# Patient Record
Sex: MALE | Race: BLACK OR AFRICAN AMERICAN | NOT HISPANIC OR LATINO | Employment: UNEMPLOYED | URBAN - METROPOLITAN AREA
[De-identification: names, ages, dates, MRNs, and addresses within clinical notes are randomized per-mention and may not be internally consistent; named-entity substitution may affect disease eponyms.]

---

## 2023-01-31 ENCOUNTER — HOSPITAL ENCOUNTER (EMERGENCY)
Dept: DATA CONVERSION | Facility: HOSPITAL | Age: 53
Discharge: HOME | End: 2023-02-01
Attending: EMERGENCY MEDICINE

## 2023-01-31 DIAGNOSIS — S60.413A ABRASION OF LEFT MIDDLE FINGER, INITIAL ENCOUNTER: ICD-10-CM
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
## 2023-01-31 DIAGNOSIS — R55 SYNCOPE AND COLLAPSE: ICD-10-CM

## 2023-01-31 DIAGNOSIS — Z20.822 CONTACT WITH AND (SUSPECTED) EXPOSURE TO COVID-19: ICD-10-CM

## 2023-01-31 DIAGNOSIS — F41.9 ANXIETY DISORDER, UNSPECIFIED: ICD-10-CM

## 2023-01-31 DIAGNOSIS — T07.XXXA UNSPECIFIED MULTIPLE INJURIES, INITIAL ENCOUNTER: ICD-10-CM

## 2023-01-31 DIAGNOSIS — G89.11 ACUTE PAIN DUE TO TRAUMA: ICD-10-CM

## 2023-01-31 DIAGNOSIS — Z23 ENCOUNTER FOR IMMUNIZATION: ICD-10-CM

## 2023-01-31 DIAGNOSIS — R09.89 OTHER SPECIFIED SYMPTOMS AND SIGNS INVOLVING THE CIRCULATORY AND RESPIRATORY SYSTEMS: ICD-10-CM

## 2023-01-31 DIAGNOSIS — R74.02 ELEVATION OF LEVELS OF LACTIC ACID DEHYDROGENASE (LDH): ICD-10-CM

## 2023-01-31 DIAGNOSIS — R45.1 RESTLESSNESS AND AGITATION: ICD-10-CM

## 2023-01-31 DIAGNOSIS — R04.0 EPISTAXIS: ICD-10-CM

## 2023-01-31 DIAGNOSIS — S02.2XXA FRACTURE OF NASAL BONES, INITIAL ENCOUNTER FOR CLOSED FRACTURE: ICD-10-CM

## 2023-01-31 DIAGNOSIS — R00.0 TACHYCARDIA, UNSPECIFIED: ICD-10-CM

## 2023-01-31 DIAGNOSIS — M17.0 BILATERAL PRIMARY OSTEOARTHRITIS OF KNEE: ICD-10-CM

## 2023-01-31 DIAGNOSIS — R51.9 HEADACHE, UNSPECIFIED: ICD-10-CM
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
## 2023-01-31 DIAGNOSIS — S01.21XA LACERATION WITHOUT FOREIGN BODY OF NOSE, INITIAL ENCOUNTER: ICD-10-CM

## 2023-01-31 DIAGNOSIS — R41.0 DISORIENTATION, UNSPECIFIED: ICD-10-CM

## 2023-01-31 DIAGNOSIS — S00.31XA ABRASION OF NOSE, INITIAL ENCOUNTER: ICD-10-CM

## 2023-01-31 DIAGNOSIS — S01.01XA LACERATION WITHOUT FOREIGN BODY OF SCALP, INITIAL ENCOUNTER: ICD-10-CM

## 2023-01-31 DIAGNOSIS — Z72.0 TOBACCO USE: ICD-10-CM

## 2023-01-31 DIAGNOSIS — F91.8 OTHER CONDUCT DISORDERS: ICD-10-CM

## 2023-01-31 LAB
ABO GROUP (TYPE) IN BLOOD: NORMAL
ABO GROUP (TYPE) IN BLOOD: NORMAL
ANION GAP IN SER/PLAS: 18 MMOL/L (ref 10–20)
ANTIBODY SCREEN: NORMAL
ANTIBODY SCREEN: NORMAL
CALCIUM (MG/DL) IN SER/PLAS: 9.8 MG/DL (ref 8.6–10.6)
CARBON DIOXIDE, TOTAL (MMOL/L) IN SER/PLAS: 19 MMOL/L (ref 21–32)
CHLORIDE (MMOL/L) IN SER/PLAS: 107 MMOL/L (ref 98–107)
CREATININE (MG/DL) IN SER/PLAS: 0.91 MG/DL (ref 0.5–1.3)
ERYTHROCYTE DISTRIBUTION WIDTH (RATIO) BY AUTOMATED COUNT: 12.5 % (ref 11.5–14.5)
ERYTHROCYTE MEAN CORPUSCULAR HEMOGLOBIN CONCENTRATION (G/DL) BY AUTOMATED: 35.3 G/DL (ref 32–36)
ERYTHROCYTE MEAN CORPUSCULAR VOLUME (FL) BY AUTOMATED COUNT: 98 FL (ref 80–100)
ERYTHROCYTES (10*6/UL) IN BLOOD BY AUTOMATED COUNT: 4.57 X10E12/L (ref 4.5–5.9)
ETHANOL (MG/DL) IN SER/PLAS: 312 MG/DL
FIBRINOGEN (MG/DL) IN PPP BY COAGULATION ASSAY: 183 MG/DL (ref 200–400)
GFR MALE: >90 ML/MIN/1.73M2
GLUCOSE (MG/DL) IN SER/PLAS: 150 MG/DL (ref 74–99)
HEMATOCRIT (%) IN BLOOD BY AUTOMATED COUNT: 45 % (ref 41–52)
HEMOGLOBIN (G/DL) IN BLOOD: 15.9 G/DL (ref 13.5–17.5)
INR IN PPP BY COAGULATION ASSAY: 0.9 (ref 0.9–1.1)
LEUKOCYTES (10*3/UL) IN BLOOD BY AUTOMATED COUNT: 2.7 X10E9/L (ref 4.4–11.3)
MAGNESIUM (MG/DL) IN SER/PLAS: 2.08 MG/DL (ref 1.6–2.4)
NRBC (PER 100 WBCS) BY AUTOMATED COUNT: 0 /100 WBC (ref 0–0)
PATIENT TEMPERATURE: 37 DEGREES C
PATIENT TEMPERATURE: 37 DEGREES C
PHOSPHATE (MG/DL) IN SER/PLAS: 2.6 MG/DL (ref 2.5–4.9)
PLATELETS (10*3/UL) IN BLOOD AUTOMATED COUNT: 217 X10E9/L (ref 150–450)
POCT ANION GAP, VENOUS: 14 MMOL/L (ref 10–25)
POCT ANION GAP, VENOUS: 16 MMOL/L (ref 10–25)
POCT BASE EXCESS, VENOUS: -3 MMOL/L (ref -2–3)
POCT BASE EXCESS, VENOUS: -3.8 MMOL/L (ref -2–3)
POCT CHLORIDE, VENOUS: 108 MMOL/L (ref 98–107)
POCT CHLORIDE, VENOUS: 108 MMOL/L (ref 98–107)
POCT GLUCOSE, VENOUS: 151 MG/DL (ref 74–99)
POCT GLUCOSE, VENOUS: 158 MG/DL (ref 74–99)
POCT HCO3, VENOUS: 19.6 MMOL/L (ref 22–26)
POCT HCO3, VENOUS: 21.2 MMOL/L (ref 22–26)
POCT HEMATOCRIT, VENOUS: 47 % (ref 41–52)
POCT HEMATOCRIT, VENOUS: 48 % (ref 41–52)
POCT HEMOGLOBIN, VENOUS: 15.7 G/DL (ref 13.5–17.5)
POCT HEMOGLOBIN, VENOUS: 16.1 G/DL (ref 13.5–17.5)
POCT IONIZED CALCIUM, VENOUS: 1.18 MMOL/L (ref 1.1–1.33)
POCT IONIZED CALCIUM, VENOUS: 1.19 MMOL/L (ref 1.1–1.33)
POCT LACTATE, VENOUS: 4.1 MMOL/L (ref 0.4–2)
POCT LACTATE, VENOUS: 4.7 MMOL/L (ref 0.4–2)
POCT OXY HEMOGLOBIN, VENOUS: 83.1 % (ref 45–75)
POCT OXY HEMOGLOBIN, VENOUS: 89.2 % (ref 45–75)
POCT PCO2, VENOUS: 31 MMHG (ref 41–51)
POCT PCO2, VENOUS: 35 MMHG (ref 41–51)
POCT PH, VENOUS: 7.39 (ref 7.33–7.43)
POCT PH, VENOUS: 7.41 (ref 7.33–7.43)
POCT PO2, VENOUS: 60 MMHG (ref 35–45)
POCT PO2, VENOUS: 67 MMHG (ref 35–45)
POCT POTASSIUM, VENOUS: 3.9 MMOL/L (ref 3.5–5.3)
POCT POTASSIUM, VENOUS: 4.5 MMOL/L (ref 3.5–5.3)
POCT SO2, VENOUS: 89 % (ref 45–75)
POCT SO2, VENOUS: 94 % (ref 45–75)
POCT SODIUM, VENOUS: 139 MMOL/L (ref 136–145)
POCT SODIUM, VENOUS: 140 MMOL/L (ref 136–145)
POTASSIUM (MMOL/L) IN SER/PLAS: 4.2 MMOL/L (ref 3.5–5.3)
PROTHROMBIN TIME (PT) IN PPP BY COAGULATION ASSAY: 10.3 SEC (ref 9.8–13.4)
RH FACTOR: NORMAL
RH FACTOR: NORMAL
SARS-COV-2 RESULT: NOT DETECTED
SODIUM (MMOL/L) IN SER/PLAS: 140 MMOL/L (ref 136–145)
UREA NITROGEN (MG/DL) IN SER/PLAS: 14 MG/DL (ref 6–23)

## 2023-02-01 LAB
PATIENT TEMPERATURE: 37 DEGREES C
PATIENT TEMPERATURE: 37 DEGREES C
POCT ANION GAP, VENOUS: 13 MMOL/L (ref 10–25)
POCT ANION GAP, VENOUS: 16 MMOL/L (ref 10–25)
POCT BASE EXCESS, VENOUS: -3.7 MMOL/L (ref -2–3)
POCT BASE EXCESS, VENOUS: -4.2 MMOL/L (ref -2–3)
POCT CHLORIDE, VENOUS: 107 MMOL/L (ref 98–107)
POCT CHLORIDE, VENOUS: 108 MMOL/L (ref 98–107)
POCT GLUCOSE, VENOUS: 143 MG/DL (ref 74–99)
POCT GLUCOSE, VENOUS: 200 MG/DL (ref 74–99)
POCT HCO3, VENOUS: 20.1 MMOL/L (ref 22–26)
POCT HCO3, VENOUS: 20.7 MMOL/L (ref 22–26)
POCT HEMATOCRIT, VENOUS: 43 % (ref 41–52)
POCT HEMATOCRIT, VENOUS: 45 % (ref 41–52)
POCT HEMOGLOBIN, VENOUS: 14.3 G/DL (ref 13.5–17.5)
POCT HEMOGLOBIN, VENOUS: 14.9 G/DL (ref 13.5–17.5)
POCT IONIZED CALCIUM, VENOUS: 1.22 MMOL/L (ref 1.1–1.33)
POCT IONIZED CALCIUM, VENOUS: 1.23 MMOL/L (ref 1.1–1.33)
POCT LACTATE, VENOUS: 3.6 MMOL/L (ref 0.4–2)
POCT LACTATE, VENOUS: 4.3 MMOL/L (ref 0.4–2)
POCT OXY HEMOGLOBIN, VENOUS: 83.4 % (ref 45–75)
POCT OXY HEMOGLOBIN, VENOUS: 83.6 % (ref 45–75)
POCT PCO2, VENOUS: 34 MMHG (ref 41–51)
POCT PCO2, VENOUS: 35 MMHG (ref 41–51)
POCT PH, VENOUS: 7.38 (ref 7.33–7.43)
POCT PH, VENOUS: 7.38 (ref 7.33–7.43)
POCT PO2, VENOUS: 56 MMHG (ref 35–45)
POCT PO2, VENOUS: 56 MMHG (ref 35–45)
POCT POTASSIUM, VENOUS: 3.8 MMOL/L (ref 3.5–5.3)
POCT POTASSIUM, VENOUS: 4.1 MMOL/L (ref 3.5–5.3)
POCT SO2, VENOUS: 87 % (ref 45–75)
POCT SO2, VENOUS: 87 % (ref 45–75)
POCT SODIUM, VENOUS: 138 MMOL/L (ref 136–145)
POCT SODIUM, VENOUS: 139 MMOL/L (ref 136–145)

## 2023-03-03 NOTE — H&P
History of Present Illness:   HPI:    Trauma XRAY is a 52 y/o male who presents to [facility] via EMS s/p Fall on staircase. Per reports Pt. was seen falling down a staircase aprox 10 stairs with brief loss  of consciousness EMS was called by family/friend on scene. Pt. reportedly had a brief LOC nad was awake and alert on EMS arrival. Pt. sustained a head laceration and lac to nose, Pt. was placed in c-collar and transported to [facility]. Pt. denies hx of AC/AP  use. Pt. endorses ETOH use this evening and is amnestic to event.     A: Airway intact  B: Breathing spontaneously, breath sounds are bilateral and equal  C: Pulses 2+throughout and equal.    D: Pupils equal and reactive, GCS 14 (E4, V4, M6). Moving all 4 extremities  E: Patient exposed and additional injuries noted; Warm blankets placed on patient    Secondary Survey:  NEURO: A&O x3, GCS 14, ROBLES equally, muscle strength 5/5, no sensory deficits  HEAD: NC, 8cm occipital laceration with minor bleeding. No bony step offs, midface stable.  EENT: PERRL, EOMI. Pupils 4-2mm b/l. external ear without laceration. 1cm Superficial laceration to nose bridge, dried blood in R nares. no  crepitus or septal hematoma. Oral mucosa and tongue without lacerations, teeth in place.   NECK: No cervical spine tenderness or step offs, no lacerations or abrasions, trachea midline. No JVD.  RESPIRATORY/CHEST: No abrasions, contusions, crepitus or tenderness to palpation. Non-labored, equal chest expansion, CTAB, no W/R/R.  CV: RRR, nml S1 and S2, no M/R/G. Pulses bilateral: 2+ radial, 2+DP, 2+PT,  2+femoral and 2+ carotid. No TTP of chest  ABDOMEN: soft, nontender, nondistended. No scars, abrasions or lacerations.  PELVIS: Stable to compression.  : nml external genitalia, no blood at urethral meatus  RECTAL: rectal tone intact with no gross blood noted on exam.  BACK/SPINE: No thoracic midline tenderness, step-offs or deformities. No lumbar midline tenderness, step-offs, or  deformities.  No abrasions, hematomas or lacerations noted.  EXTREMITIES: Superficial abrasion to Dorsal aspect of 3th digit.     ROS:  A complete 12-point review of systems was performed and is otherwise negative, except as noted in HPI.    PMH: Arthritis     PSH: Unable to specify     SH: daily tobacco use, admits to alcohol and denies illicit drugs    FH: No hx bleeding; clotting disorders    Allergies: NKDA    Medications: Tylenol    Interventions in [place]:  CXR- Unremarkable  Pelvis Film-Unremarkable   Tetanus  PRBC X 1  1L NS X 1  Staple closure to head wound  IV Haldol               Mechanism of Injury:  ·  Mechanism of Injury fall     Method of Arrival:  ·  Method of Arrival EMS     Primary Survey:   Airway Assessment:  ·  Airway intact     Breathing:  ·  Breathing equal bilateral breath sounds   ·  Breathing Left Side clear   ·  Breathing Right Side clear     Pulses:  ·  Radial Pulse Left 2+ (normal)   ·  Radial Pulse Right 2+ (normal)   ·  Femoral Pulse Left 2+ (normal)   ·  Femoral Pulse Right 2+ (normal)   ·  Dorsalis Pedis Pulse Left 2+ (normal)   ·  Dorsalis Pedis Pulse Right 2+ (normal)     Disability:  ·  Disability awake, neurologically intact     [place] T Coma Scale:    [place] T Coma Scale:   Best Eye Response: (E4) spontaneous   Best Motor Response: (M6) obeys commands   Best Verbal Response: (V4) confused   [place] Score: 14     ·  Exposure / Environment    Exposed for PE then covered in warm blankets.       Allergies:       Allergies:  ·  No Known Allergies :       Home Medications:  * Outpatient Medication Status not yet specified      Assessment and Plan:  ·  Assessment and Plan    Trauma XRAY is a 52 y/o male who presents s/p fall down 10 stairs.     List of clinically significant injuries/problems:  -Head Laceration   -Nose Lacerations  -Alcohol intoxication    Plan:  -f/u PAN scan imaging  -Keep in C-collar and CTLS precautions pending films  -Keep NPO  -Analgesia aper ED   -Basic wound  care to nose lac  -Head lac cleaned and closed w/ staples (will f/u for removal in trauma clinic)   -Please have Pt. follow up in trauma clinic for staple removal     Pt. seen and plan discussed w/ my attending Dr. Raza Simon [facility]  Trauma Surgery  [id number]    Addendum: PAN Scan imaging reviewed without acute traumatic injuries identified, no indication for admission under the trauma service Trauma Surgery to sign off.         Attestation:   Note Completion:  I am a:  Advanced Practice Provider   Attending Only - Shared Visit with Advanced Practice Provider This is a shared visit.  I have reviewed the Advanced Practice Provider?s encounter note, approve the Advanced Practice Provider?s documentation,  and provide the following additional information from my personal encounter.    Comments/ Additional Findings    53 M presenting as limited trauma after fall down 10 stairs. Airway was intact with bilateral breath sounds. He was tachycardic to 140s, with a normal  SBP although narrowed pulse pressure (30). He was actively bleeding from an 8 cm scalp laceration which saturated at least two ABD pads. Scalp laceration was repaired with staples, which resulted in hemostasis. We gave him 1 unit of RBC. GCS 15, although  he was acutely agitated and clinically intoxicated. He received 5 mg iv haldol to facilitate completion of secondary survey and imaging.    CT scan revealed no additional injuries. Will assess for cervical collar clearance once he is no longer intoxicated.    Critical Care Patient I have reviewed and evaluated the most recent data and results, personally examined the patient, and formulated the plan of care as presented above.  This patient  was critically ill and required continued critical care treatment. Teaching and any separately billable procedures are not included in the time calculation.    Billing Provider Critical Care Time 40 minute(s)   Primary Critical Care Issue/Treatment (See  Assessment and Plan for greater detail) -- This patient has significantly altered mental status (delirium, encephalopathy, coma, or anoxic brain damage). We are treating with appropriate medications,  hemodynamic support, ventilatory and/or oxygenating support, as indicated, as well as doing intensive diagnostic evaluation and neurological monitoring. Please see assessment and plan above for greater detail.; -- This patient is, or has been, hemodynamically  unstable.  We are treating with appropriate medications, as well as doing intensive diagnostic evaluation and monitoring. Please see assessment and plan above for greater detail.; -- This patient is known, or believed, to have sustained life or limb threatening  trauma. We are treating with appropriate blood products, fluids and/or pressors, and orthopedic/surgical intervention, as indicated, as well as doing intensive diagnostic evaluation and monitoring. Please see assessment and plan above for greater detail.;  -- This patient is believed to have hemorrhagic shock. We are treating with appropriate blood products, fluids and/or pressors, as indicated, as well as intensive monitoring. Please see assessment and plan above for greater detail.         Electronic Signatures:  Jose Rafael Simon (APRN-CNP)  (Signed 01-Feb-2023 00:01)   Authored: History of Present Illness, Primary Survey,  Secondary Survey, Allergies, Medications, Assessment and Plan, Note Completion  Saranya Person)  (Signed 01-Feb-2023 07:00)   Authored: Note Completion   Co-Signer: Primary Survey, Assessment and Plan, Note Completion      Last Updated: 01-Feb-2023 07:00 by Saranya Person)

## 2023-09-14 NOTE — H&P
History of Present Illness:   HPI:    Trauma XRAY is a 54 y/o male who presents to [facility] via EMS s/p Fall on staircase. Per reports Pt. was seen falling down a staircase aprox 10 stairs with brief loss  of consciousness EMS was called by family/friend on scene. Pt. reportedly had a brief LOC nad was awake and alert on EMS arrival. Pt. sustained a head laceration and lac to nose, Pt. was placed in c-collar and transported to [facility]. Pt. denies hx of AC/AP  use. Pt. endorses ETOH use this evening and is amnestic to event.     A: Airway intact  B: Breathing spontaneously, breath sounds are bilateral and equal  C: Pulses 2+throughout and equal.    D: Pupils equal and reactive, GCS 14 (E4, V4, M6). Moving all 4 extremities  E: Patient exposed and additional injuries noted; Warm blankets placed on patient    Secondary Survey:  NEURO: A&O x3, GCS 14, ROBLES equally, muscle strength 5/5, no sensory deficits  HEAD: NC, 8cm occipital laceration with minor bleeding. No bony step offs, midface stable.  EENT: PERRL, EOMI. Pupils 4-2mm b/l. external ear without laceration. 1cm Superficial laceration to nose bridge, dried blood in R nares. no  crepitus or septal hematoma. Oral mucosa and tongue without lacerations, teeth in place.   NECK: No cervical spine tenderness or step offs, no lacerations or abrasions, trachea midline. No JVD.  RESPIRATORY/CHEST: No abrasions, contusions, crepitus or tenderness to palpation. Non-labored, equal chest expansion, CTAB, no W/R/R.  CV: RRR, nml S1 and S2, no M/R/G. Pulses bilateral: 2+ radial, 2+DP, 2+PT,  2+femoral and 2+ carotid. No TTP of chest  ABDOMEN: soft, nontender, nondistended. No scars, abrasions or lacerations.  PELVIS: Stable to compression.  : nml external genitalia, no blood at urethral meatus  RECTAL: rectal tone intact with no gross blood noted on exam.  BACK/SPINE: No thoracic midline tenderness, step-offs or deformities. No lumbar midline tenderness, step-offs, or  deformities.  No abrasions, hematomas or lacerations noted.  EXTREMITIES: Superficial abrasion to Dorsal aspect of 3th digit.     ROS:  A complete 12-point review of systems was performed and is otherwise negative, except as noted in HPI.    PMH: Arthritis     PSH: Unable to specify     SH: daily tobacco use, admits to alcohol and denies illicit drugs    FH: No hx bleeding; clotting disorders    Allergies: NKDA    Medications: Tylenol    Interventions in [place]:  CXR- Unremarkable  Pelvis Film-Unremarkable   Tetanus  PRBC X 1  1L NS X 1  Staple closure to head wound  IV Haldol               Mechanism of Injury:  ·  Mechanism of Injury fall     Method of Arrival:  ·  Method of Arrival EMS     Primary Survey:   Airway Assessment:  ·  Airway intact     Breathing:  ·  Breathing equal bilateral breath sounds   ·  Breathing Left Side clear   ·  Breathing Right Side clear     Pulses:  ·  Radial Pulse Left 2+ (normal)   ·  Radial Pulse Right 2+ (normal)   ·  Femoral Pulse Left 2+ (normal)   ·  Femoral Pulse Right 2+ (normal)   ·  Dorsalis Pedis Pulse Left 2+ (normal)   ·  Dorsalis Pedis Pulse Right 2+ (normal)     Disability:  ·  Disability awake, neurologically intact     [place] T Coma Scale:    [place] T Coma Scale:   Best Eye Response: (E4) spontaneous   Best Motor Response: (M6) obeys commands   Best Verbal Response: (V4) confused   [place] Score: 14     ·  Exposure / Environment    Exposed for PE then covered in warm blankets.       Allergies:       Allergies:  ·  No Known Allergies :       Home Medications:  * Outpatient Medication Status not yet specified      Assessment and Plan:  ·  Assessment and Plan    Trauma XRAY is a 52 y/o male who presents s/p fall down 10 stairs.     List of clinically significant injuries/problems:  -Head Laceration   -Nose Lacerations  -Alcohol intoxication    Plan:  -f/u PAN scan imaging  -Keep in C-collar and CTLS precautions pending films  -Keep NPO  -Analgesia aper ED   -Basic wound  care to nose lac  -Head lac cleaned and closed w/ staples (will f/u for removal in trauma clinic)   -Please have Pt. follow up in trauma clinic for staple removal     Pt. seen and plan discussed w/ my attending Dr. Raza Simon [facility]  Trauma Surgery  [id number]    Addendum: PAN Scan imaging reviewed without acute traumatic injuries identified, no indication for admission under the trauma service Trauma Surgery to sign off.         Attestation:   Note Completion:  I am a:  Advanced Practice Provider   Attending Only - Shared Visit with Advanced Practice Provider This is a shared visit.  I have reviewed the Advanced Practice Provider?s encounter note, approve the Advanced Practice Provider?s documentation,  and provide the following additional information from my personal encounter.    Comments/ Additional Findings    53 M presenting as limited trauma after fall down 10 stairs. Airway was intact with bilateral breath sounds. He was tachycardic to 140s, with a normal  SBP although narrowed pulse pressure (30). He was actively bleeding from an 8 cm scalp laceration which saturated at least two ABD pads. Scalp laceration was repaired with staples, which resulted in hemostasis. We gave him 1 unit of RBC. GCS 15, although  he was acutely agitated and clinically intoxicated. He received 5 mg iv haldol to facilitate completion of secondary survey and imaging.    CT scan revealed no additional injuries. Will assess for cervical collar clearance once he is no longer intoxicated.    Critical Care Patient I have reviewed and evaluated the most recent data and results, personally examined the patient, and formulated the plan of care as presented above.  This patient  was critically ill and required continued critical care treatment. Teaching and any separately billable procedures are not included in the time calculation.    Billing Provider Critical Care Time 40 minute(s)   Primary Critical Care Issue/Treatment (See  Assessment and Plan for greater detail) -- This patient has significantly altered mental status (delirium, encephalopathy, coma, or anoxic brain damage). We are treating with appropriate medications,  hemodynamic support, ventilatory and/or oxygenating support, as indicated, as well as doing intensive diagnostic evaluation and neurological monitoring. Please see assessment and plan above for greater detail.; -- This patient is, or has been, hemodynamically  unstable.  We are treating with appropriate medications, as well as doing intensive diagnostic evaluation and monitoring. Please see assessment and plan above for greater detail.; -- This patient is known, or believed, to have sustained life or limb threatening  trauma. We are treating with appropriate blood products, fluids and/or pressors, and orthopedic/surgical intervention, as indicated, as well as doing intensive diagnostic evaluation and monitoring. Please see assessment and plan above for greater detail.;  -- This patient is believed to have hemorrhagic shock. We are treating with appropriate blood products, fluids and/or pressors, as indicated, as well as intensive monitoring. Please see assessment and plan above for greater detail.         Electronic Signatures:  Jose Rafael Simon (APRN-CNP)  (Signed 01-Feb-2023 00:01)   Authored: History of Present Illness, Primary Survey,  Secondary Survey, Allergies, Medications, Assessment and Plan, Note Completion  Saranya Person)  (Signed 01-Feb-2023 07:00)   Authored: Note Completion   Co-Signer: Primary Survey, Assessment and Plan, Note Completion      Last Updated: 01-Feb-2023 07:00 by Saranya Person)